# Patient Record
Sex: FEMALE | Race: BLACK OR AFRICAN AMERICAN | Employment: OTHER | URBAN - METROPOLITAN AREA
[De-identification: names, ages, dates, MRNs, and addresses within clinical notes are randomized per-mention and may not be internally consistent; named-entity substitution may affect disease eponyms.]

---

## 2019-01-01 ENCOUNTER — HOSPITAL ENCOUNTER (EMERGENCY)
Age: 84
End: 2019-04-16
Attending: EMERGENCY MEDICINE | Admitting: EMERGENCY MEDICINE
Payer: MEDICARE

## 2019-01-01 DIAGNOSIS — R09.02 HYPOXIA: ICD-10-CM

## 2019-01-01 DIAGNOSIS — I46.9 CARDIAC ARREST (HCC): Primary | ICD-10-CM

## 2019-01-01 LAB
ALBUMIN SERPL-MCNC: 2.3 G/DL (ref 3.4–5)
ALBUMIN/GLOB SERPL: 0.7 {RATIO} (ref 0.8–1.7)
ALP SERPL-CCNC: 176 U/L (ref 45–117)
ALT SERPL-CCNC: 337 U/L (ref 13–56)
ANION GAP BLD CALC-SCNC: 17 MMOL/L (ref 10–20)
ANION GAP SERPL CALC-SCNC: 11 MMOL/L (ref 3–18)
APTT PPP: 47.7 SEC (ref 23–36.4)
AST SERPL-CCNC: 431 U/L (ref 15–37)
BASOPHILS # BLD: 0 K/UL (ref 0–0.06)
BASOPHILS NFR BLD: 0 % (ref 0–3)
BILIRUB SERPL-MCNC: 0.3 MG/DL (ref 0.2–1)
BLASTS NFR BLD MANUAL: 1 %
BUN BLD-MCNC: 32 MG/DL (ref 7–18)
BUN SERPL-MCNC: 26 MG/DL (ref 7–18)
BUN/CREAT SERPL: 16 (ref 12–20)
CA-I BLD-MCNC: 1.2 MMOL/L (ref 1.12–1.32)
CALCIUM SERPL-MCNC: 10.2 MG/DL (ref 8.5–10.1)
CHLORIDE BLD-SCNC: 105 MMOL/L (ref 100–108)
CHLORIDE SERPL-SCNC: 107 MMOL/L (ref 100–108)
CK MB CFR SERPL CALC: 1.7 % (ref 0–4)
CK MB SERPL-MCNC: 2.7 NG/ML (ref 5–25)
CK SERPL-CCNC: 163 U/L (ref 26–192)
CO2 BLD-SCNC: 23 MMOL/L (ref 19–24)
CO2 SERPL-SCNC: 23 MMOL/L (ref 21–32)
CREAT SERPL-MCNC: 1.67 MG/DL (ref 0.6–1.3)
CREAT UR-MCNC: 1.4 MG/DL (ref 0.6–1.3)
DIFFERENTIAL METHOD BLD: ABNORMAL
EOSINOPHIL # BLD: 1.1 K/UL (ref 0–0.4)
EOSINOPHIL NFR BLD: 8 % (ref 0–5)
ERYTHROCYTE [DISTWIDTH] IN BLOOD BY AUTOMATED COUNT: 15.3 % (ref 11.6–14.5)
GLOBULIN SER CALC-MCNC: 3.5 G/DL (ref 2–4)
GLUCOSE BLD STRIP.AUTO-MCNC: 371 MG/DL (ref 74–106)
GLUCOSE SERPL-MCNC: 379 MG/DL (ref 74–99)
HCT VFR BLD AUTO: 35.1 % (ref 35–45)
HCT VFR BLD CALC: 35 % (ref 36–49)
HGB BLD-MCNC: 10.7 G/DL (ref 12–16)
HGB BLD-MCNC: 11.9 G/DL (ref 12–16)
INR PPP: 1.4 (ref 0.8–1.2)
LACTATE BLD-SCNC: 8.13 MMOL/L (ref 0.4–2)
LYMPHOCYTES # BLD: 5.1 K/UL (ref 0.8–3.5)
LYMPHOCYTES NFR BLD: 36 % (ref 20–51)
MCH RBC QN AUTO: 28 PG (ref 24–34)
MCHC RBC AUTO-ENTMCNC: 30.5 G/DL (ref 31–37)
MCV RBC AUTO: 91.9 FL (ref 74–97)
MONOCYTES # BLD: 0.7 K/UL (ref 0–1)
MONOCYTES NFR BLD: 5 % (ref 2–9)
NEUTS BAND NFR BLD MANUAL: 5 % (ref 0–5)
NEUTS SEG # BLD: 7.2 K/UL (ref 1.8–8)
NEUTS SEG NFR BLD: 45 % (ref 42–75)
PLATELET # BLD AUTO: 123 K/UL (ref 135–420)
PLATELET COMMENTS,PCOM: ABNORMAL
PMV BLD AUTO: 11.9 FL (ref 9.2–11.8)
POTASSIUM BLD-SCNC: 4.6 MMOL/L (ref 3.5–5.5)
POTASSIUM SERPL-SCNC: 4.7 MMOL/L (ref 3.5–5.5)
PROT SERPL-MCNC: 5.8 G/DL (ref 6.4–8.2)
PROTHROMBIN TIME: 16.5 SEC (ref 11.5–15.2)
RBC # BLD AUTO: 3.82 M/UL (ref 4.2–5.3)
RBC MORPH BLD: ABNORMAL
SODIUM BLD-SCNC: 140 MMOL/L (ref 136–145)
SODIUM SERPL-SCNC: 141 MMOL/L (ref 136–145)
TROPONIN I BLD-MCNC: <0.04 NG/ML (ref 0–0.08)
TROPONIN I SERPL-MCNC: 0.07 NG/ML (ref 0–0.04)
WBC # BLD AUTO: 14.3 K/UL (ref 4.6–13.2)

## 2019-01-01 PROCEDURE — 85610 PROTHROMBIN TIME: CPT

## 2019-01-01 PROCEDURE — 83605 ASSAY OF LACTIC ACID: CPT

## 2019-01-01 PROCEDURE — 85025 COMPLETE CBC W/AUTO DIFF WBC: CPT

## 2019-01-01 PROCEDURE — 80047 BASIC METABLC PNL IONIZED CA: CPT

## 2019-01-01 PROCEDURE — 74011250636 HC RX REV CODE- 250/636: Performed by: EMERGENCY MEDICINE

## 2019-01-01 PROCEDURE — 80053 COMPREHEN METABOLIC PANEL: CPT

## 2019-01-01 PROCEDURE — 82550 ASSAY OF CK (CPK): CPT

## 2019-01-01 PROCEDURE — 74011000250 HC RX REV CODE- 250: Performed by: EMERGENCY MEDICINE

## 2019-01-01 PROCEDURE — 85730 THROMBOPLASTIN TIME PARTIAL: CPT

## 2019-01-01 PROCEDURE — 92950 HEART/LUNG RESUSCITATION CPR: CPT

## 2019-01-01 PROCEDURE — 84484 ASSAY OF TROPONIN QUANT: CPT

## 2019-01-01 PROCEDURE — 99284 EMERGENCY DEPT VISIT MOD MDM: CPT

## 2019-01-01 RX ORDER — EPINEPHRINE 0.1 MG/ML
INJECTION INTRACARDIAC; INTRAVENOUS
Status: COMPLETED | OUTPATIENT
Start: 2019-01-01 | End: 2019-01-01

## 2019-01-01 RX ORDER — CALCIUM CHLORIDE INJECTION 100 MG/ML
INJECTION, SOLUTION INTRAVENOUS
Status: COMPLETED | OUTPATIENT
Start: 2019-01-01 | End: 2019-01-01

## 2019-01-01 RX ORDER — SODIUM BICARBONATE 1 MEQ/ML
SYRINGE (ML) INTRAVENOUS
Status: COMPLETED | OUTPATIENT
Start: 2019-01-01 | End: 2019-01-01

## 2019-01-01 RX ADMIN — CALCIUM CHLORIDE 1 G: 100 INJECTION, SOLUTION INTRAVENOUS at 15:46

## 2019-01-01 RX ADMIN — Medication 50 MEQ: at 15:49

## 2019-01-01 RX ADMIN — EPINEPHRINE 1 MG: 0.1 INJECTION, SOLUTION ENDOTRACHEAL; INTRACARDIAC; INTRAVENOUS at 15:39

## 2019-01-01 RX ADMIN — EPINEPHRINE 1 MG: 0.1 INJECTION, SOLUTION ENDOTRACHEAL; INTRACARDIAC; INTRAVENOUS at 15:49

## 2019-01-01 RX ADMIN — EPINEPHRINE 1 MG: 0.1 INJECTION, SOLUTION ENDOTRACHEAL; INTRACARDIAC; INTRAVENOUS at 15:44

## 2019-04-16 NOTE — ED PROVIDER NOTES
Emergency Department Treatment Report Patient: Harry Fonseca Age: 82 y.o. Sex: female YOB: 1935 Admit Date: 4/16/2019 PCP: Bartolome Viera NP  
MRN: [id number]  CSN: [id number] Room: 02/ Time Dictated: 3:57 PM   
 
Chief Complaint Cardiac Arrest 
 
History of Present Illness 82 y.o. female, PMH COPD, CHF, asthma, presents in EMS in PEA cardiac arrest.  She was at [facility] for shortness of breath when she became increasingly short of breath. EMS arrived and as she was about to be placed on CPAP, she began to become hypoxic, alcides down and became unresponsive. CPR was immediately started and she was given 3 rounds of epi and intubated prior to arrival.  She arrives to the ER in PEA arrest. 
 
Review of Systems +SOB Past Medical/Surgical History Past Medical History:  
Diagnosis Date  Arthritis  Asthma  COPD  Diabetes ([address]  Diabetes mellitus  Hypertension Past Surgical History:  
Procedure Laterality Date  HX GYN    
 chet/bso  HX HEENT    
 tubes removed from ears Social History Social History Socioeconomic History  Marital status:  Spouse name: Not on file  Number of children: Not on file  Years of education: Not on file  Highest education level: Not on file Tobacco Use  Smoking status: Never Smoker Substance and Sexual Activity  Alcohol use: No  
 Drug use: No  
 Sexual activity: Never Family History No family history on file. Home Medications Prior to Admission Medications Prescriptions Last Dose Informant Patient Reported? Taking? beclomethasone (QVAR) 80 mcg/Actuation inhaler   Yes No  
Sig: Take 1 Puff by inhalation two (2) times a day. hydrochlorothiazide (HYDRODIURIL) 25 mg tablet   Yes No  
Sig: Take 25 mg by mouth daily. lisinopril (PRINIVIL, ZESTRIL) 40 mg tablet   Yes No  
Sig: Take 40 mg by mouth daily.   
metoprolol (LOPRESSOR) 50 mg tablet   Yes No  
 Sig: Take  by mouth two (2) times a day. pravastatin (PRAVACHOL) 80 mg tablet   Yes No  
Sig: Take 80 mg by mouth daily. Facility-Administered Medications: None Allergies No Known Allergies Physical Exam  
 
ED Triage Vitals ED Encounter Vitals Group BP Pulse Resp Temp Temp src SpO2 Weight Height Constitutional: Unresponsive HEENT: Conjunctiva clear. Fixed and dilated pupils. ETT in place. Neck: symmetrical 
Respiratory: B/L breath sounds, artifical respirations Cardiovascular: absent, no peripheral pulses Gastrointestinal:  Abdomen soft Musculoskeletal: no asymmetrical swelling Integumentary: warm and dry without rashes or lesions Neurologic: No spontaneous movements Diagnostic Studies Lab:  
Recent Results (from the past 12 hour(s)) CBC WITH AUTOMATED DIFF Collection Time: 04/16/19  3:50 PM  
Result Value Ref Range WBC 14.3 (H) 4.6 - 13.2 K/uL  
 RBC 3.82 (L) 4.20 - 5.30 M/uL  
 HGB 10.7 (L) 12.0 - 16.0 g/dL HCT 35.1 35.0 - 45.0 % MCV 91.9 74.0 - 97.0 FL  
 MCH 28.0 24.0 - 34.0 PG  
 MCHC 30.5 (L) 31.0 - 37.0 g/dL  
 RDW 15.3 (H) 11.6 - 14.5 % PLATELET 475 (L) 782 - 420 K/uL MPV 11.9 (H) 9.2 - 11.8 FL  
 NEUTROPHILS PENDING % LYMPHOCYTES PENDING % MONOCYTES PENDING % EOSINOPHILS PENDING % BASOPHILS PENDING %  
 ABS. NEUTROPHILS PENDING K/UL  
 ABS. LYMPHOCYTES PENDING K/UL  
 ABS. MONOCYTES PENDING K/UL  
 ABS. EOSINOPHILS PENDING K/UL  
 ABS. BASOPHILS PENDING K/UL  
 DF PENDING   
CARDIAC PANEL,(CK, CKMB & TROPONIN) Collection Time: 04/16/19  3:50 PM  
Result Value Ref Range  26 - 192 U/L  
 CK - MB 2.7 <3.6 ng/ml CK-MB Index 1.7 0.0 - 4.0 % Troponin-I, QT 0.07 (H) 0.0 - 1.135 NG/ML  
METABOLIC PANEL, COMPREHENSIVE Collection Time: 04/16/19  3:50 PM  
Result Value Ref Range Sodium 141 136 - 145 mmol/L Potassium 4.7 3.5 - 5.5 mmol/L  Chloride 107 100 - 108 mmol/L  
 CO2 23 21 - 32 mmol/L Anion gap 11 3.0 - 18 mmol/L Glucose 379 (H) 74 - 99 mg/dL BUN 26 (H) 7.0 - 18 MG/DL Creatinine 1.67 (H) 0.6 - 1.3 MG/DL  
 BUN/Creatinine ratio 16 12 - 20 GFR est AA 36 (L) >60 ml/min/1.73m2 GFR est non-AA 29 (L) >60 ml/min/1.73m2 Calcium 10.2 (H) 8.5 - 10.1 MG/DL Bilirubin, total 0.3 0.2 - 1.0 MG/DL  
 ALT (SGPT) 337 (H) 13 - 56 U/L  
 AST (SGOT) 431 (H) 15 - 37 U/L Alk. phosphatase 176 (H) 45 - 117 U/L Protein, total 5.8 (L) 6.4 - 8.2 g/dL Albumin 2.3 (L) 3.4 - 5.0 g/dL Globulin 3.5 2.0 - 4.0 g/dL A-G Ratio 0.7 (L) 0.8 - 1.7 PROTHROMBIN TIME + INR Collection Time: 04/16/19  3:50 PM  
Result Value Ref Range Prothrombin time 16.5 (H) 11.5 - 15.2 sec INR 1.4 (H) 0.8 - 1.2 PTT Collection Time: 04/16/19  3:50 PM  
Result Value Ref Range aPTT 47.7 (H) 23.0 - 36.4 SEC  
POC TROPONIN-I Collection Time: 04/16/19  3:52 PM  
Result Value Ref Range Troponin-I (POC) <0.04 0.00 - 0.08 ng/mL POC CHEM8 Collection Time: 04/16/19  3:53 PM  
Result Value Ref Range CO2, POC 23 19 - 24 MMOL/L Glucose,  (H) 74 - 106 MG/DL  
 BUN, POC 32 (H) 7 - 18 MG/DL Creatinine, POC 1.4 (H) 0.6 - 1.3 MG/DL  
 GFRAA, POC 44 (L) >60 ml/min/1.73m2 GFRNA, POC 36 (L) >60 ml/min/1.73m2 Sodium,  136 - 145 MMOL/L Potassium, POC 4.6 3.5 - 5.5 MMOL/L Calcium, ionized (POC) 1.20 1. 12 - 1.32 mmol/L Chloride,  100 - 108 MMOL/L Anion gap, POC 17 10 - 20 Hematocrit, POC 35 (L) 36 - 49 % Hemoglobin, POC 11.9 (L) 12 - 16 G/DL POC LACTIC ACID Collection Time: 04/16/19  3:54 PM  
Result Value Ref Range Lactic Acid (POC) 8.13 (HH) 0.40 - 2.00 mmol/L Imaging: No results found. Ben 
 
 
ED Course/Medical Decision Making Patient arrives in PEA arrest.  After 45 minutes, she remained in PEA arrest.  Suspect this is from a hypoxic respiratory failure.   Time of death called at 15:54. Dr. Vilma Han (her PMD) was notified and will fill out the death certificate. In addition, family members were in the ER and are notified. Critical Care Time: The services I provided to this patient were to treat and/or prevent clinically significant deterioration that could result in the failure of one or more body systems and/or organ systems due to cardiac arrest. 
 
Services included the following: 
-reviewing nursing notes and old charts 
-vital sign assessments 
-direct patient care 
-medication orders and management 
-interpreting and reviewing diagnostic studies/labs 
-re-evaluations 
-documentation time Aggregate critical care time was 45 minutes, which includes only time during which I was engaged in work directly related to the patient's care as described above, whether I was at bedside or elsewhere in the Emergency Department. It did not include time spent performing other reported procedures or the services of residents, students, nurses, or advance practice providers. Roro Tucker MD 
 
5:10 PM 
 
Medications EPINEPHrine (ADRENALIN) 0.1 mg/mL syringe (1 mg IntraVENous Given 19) calcium chloride injection (1 g IntraVENous Given 19)  
sodium bicarbonate 8.4 % (1 mEq/mL) injection (50 mEq IntraVENous Given 19) Final Diagnosis ICD-10-CM ICD-9-CM 1. Cardiac arrest (HCC) I46.9 427.5 2. Hypoxia R09.02 799.02 Disposition Patient is . My Medications ASK your doctor about these medications Instructions Each Dose to Equal Morning Noon Evening Bedtime  
hydroCHLOROthiazide 25 mg tablet Commonly known as:  HYDRODIURIL Your last dose was: Your next dose is: Take 25 mg by mouth daily. 25 mg 
  
  
  
  
  
lisinopril 40 mg tablet Commonly known as:  Samm Shutters Your last dose was: Your next dose is: Take 40 mg by mouth daily.  
 40 mg 
  
  
  
  
  
 metoprolol tartrate 50 mg tablet Commonly known as:  LOPRESSOR Your last dose was: Your next dose is: Take  by mouth two (2) times a day. pravastatin 80 mg tablet Commonly known as:  PRAVACHOL Your last dose was: Your next dose is: Take 80 mg by mouth daily. 80 mg 
  
  
  
  
  
QVAR 80 mcg/actuation [employer] Generic drug:  beclomethasone Your last dose was: Your next dose is: Take 1 Puff by inhalation two (2) times a day. 1 Puff Alfred Abebe MD 
April 16, 2019 My signature above authenticates this document and my orders, the final   
diagnosis (es), discharge prescription (s), and instructions in the Epic   
record. If you have any questions please contact [phone]. Nursing notes have been reviewed by the physician/ advanced practice Clinician. Disclaimer: Sections of this note are dictated using utilizing voice recognition software. Minor typographical errors may be present. If questions arise, please do not hesitate to contact me or call our department.

## 2019-04-16 NOTE — ED NOTES
Discussed with patient's sister about Ms. Freire's passing. Patient asked me to call Ms. Freire's . I called [phone] and spoke with Mr. Amelia Bonilla and Mr. Marquise Nye (Mr. Freire's caretaker whom Ms. Freire's sister gave permission for me to speak with). I explained that patient did pass away and advised that they come to the ER to be with their loved one. Mr. Cordell Garcia and Mr. Amelia Bonilla said that they do not drive but will be by [facility] soon. Patient's sister communicated with Marquise Nye as well. I also discussed with Dr. Carol Fabian [facility]) who will accept Ms. Freire's death certificate. Jey Sheldon MD, PGY-3 
[address] April 16, 2019 4:58 PM

## 2019-04-16 NOTE — ED NOTES
Patient arrived via EMS from [place]. Patient was found by EMS in respiratory distress, obtunded, and tachypneic. EMS began compressions. Patient was given 4 epi by EMS. 7 O ET Tube present on arrival. . Hx of COPD, CHF.

## 2019-04-16 NOTE — PROGRESS NOTES
responded to Death of  Reji Mock, who was a 82 y.o.,female, The  provided the following Interventions: 
Provided crisis pastoral care, pastoral support and grief interventions to Kashif Donaldson, patient's younger sister who came half hour later after patient was brought in to [facility] Emergency room by paramedics. Offered prayers on behalf of the patient. Grief support provided. Patient's , Cozette Bamberger, was informed of the death by Dr. Sarah Trujillo. Not able to contact patient's son, Nicole Hurd - no contact information on record.  arrangements to be determined. Chart reviewed. Plan: 
Chaplains will continue to follow and will provide pastoral care on an as needed/requested basis and grief support for the family. Chaplain Resident Lena Luevano [facility]  
[phone]